# Patient Record
Sex: FEMALE | Race: WHITE | Employment: OTHER | URBAN - METROPOLITAN AREA
[De-identification: names, ages, dates, MRNs, and addresses within clinical notes are randomized per-mention and may not be internally consistent; named-entity substitution may affect disease eponyms.]

---

## 2019-03-22 PROBLEM — G45.9 TIA (TRANSIENT ISCHEMIC ATTACK): Status: ACTIVE | Noted: 2019-03-22

## 2019-03-23 ENCOUNTER — APPOINTMENT (OUTPATIENT)
Dept: ULTRASOUND IMAGING | Age: 84
End: 2019-03-23
Attending: INTERNAL MEDICINE
Payer: MEDICARE

## 2019-03-23 ENCOUNTER — HOSPITAL ENCOUNTER (OUTPATIENT)
Age: 84
Setting detail: OBSERVATION
Discharge: HOME | End: 2019-03-24
Attending: INTERNAL MEDICINE | Admitting: INTERNAL MEDICINE
Payer: MEDICARE

## 2019-03-23 ENCOUNTER — APPOINTMENT (OUTPATIENT)
Dept: MRI IMAGING | Age: 84
End: 2019-03-23
Attending: INTERNAL MEDICINE
Payer: MEDICARE

## 2019-03-23 DIAGNOSIS — G45.9 TIA (TRANSIENT ISCHEMIC ATTACK): Primary | ICD-10-CM

## 2019-03-23 LAB
ANION GAP SERPL CALCULATED.3IONS-SCNC: 11 MMOL/L (ref 7–16)
BUN BLDV-MCNC: 19 MG/DL (ref 8–23)
CALCIUM SERPL-MCNC: 9.2 MG/DL (ref 8.6–10.2)
CHLORIDE BLD-SCNC: 102 MMOL/L (ref 98–107)
CO2: 29 MMOL/L (ref 22–29)
CREAT SERPL-MCNC: 0.9 MG/DL (ref 0.5–1)
GFR AFRICAN AMERICAN: >60
GFR NON-AFRICAN AMERICAN: 60 ML/MIN/1.73
GLUCOSE BLD-MCNC: 110 MG/DL (ref 74–99)
HCT VFR BLD CALC: 38.3 % (ref 34–48)
HEMOGLOBIN: 12.8 G/DL (ref 11.5–15.5)
MCH RBC QN AUTO: 31.4 PG (ref 26–35)
MCHC RBC AUTO-ENTMCNC: 33.4 % (ref 32–34.5)
MCV RBC AUTO: 94.1 FL (ref 80–99.9)
PDW BLD-RTO: 12.2 FL (ref 11.5–15)
PLATELET # BLD: 236 E9/L (ref 130–450)
PMV BLD AUTO: 9.3 FL (ref 7–12)
POTASSIUM SERPL-SCNC: 4 MMOL/L (ref 3.5–5)
RBC # BLD: 4.07 E12/L (ref 3.5–5.5)
SODIUM BLD-SCNC: 142 MMOL/L (ref 132–146)
WBC # BLD: 7 E9/L (ref 4.5–11.5)

## 2019-03-23 PROCEDURE — 85027 COMPLETE CBC AUTOMATED: CPT

## 2019-03-23 PROCEDURE — 2580000003 HC RX 258: Performed by: INTERNAL MEDICINE

## 2019-03-23 PROCEDURE — G0378 HOSPITAL OBSERVATION PER HR: HCPCS

## 2019-03-23 PROCEDURE — 6360000002 HC RX W HCPCS: Performed by: INTERNAL MEDICINE

## 2019-03-23 PROCEDURE — 99221 1ST HOSP IP/OBS SF/LOW 40: CPT | Performed by: PSYCHIATRY & NEUROLOGY

## 2019-03-23 PROCEDURE — 97166 OT EVAL MOD COMPLEX 45 MIN: CPT

## 2019-03-23 PROCEDURE — 96374 THER/PROPH/DIAG INJ IV PUSH: CPT

## 2019-03-23 PROCEDURE — 93880 EXTRACRANIAL BILAT STUDY: CPT

## 2019-03-23 PROCEDURE — 97530 THERAPEUTIC ACTIVITIES: CPT

## 2019-03-23 PROCEDURE — 36415 COLL VENOUS BLD VENIPUNCTURE: CPT

## 2019-03-23 PROCEDURE — 70551 MRI BRAIN STEM W/O DYE: CPT

## 2019-03-23 PROCEDURE — 96372 THER/PROPH/DIAG INJ SC/IM: CPT

## 2019-03-23 PROCEDURE — 80048 BASIC METABOLIC PNL TOTAL CA: CPT

## 2019-03-23 PROCEDURE — 6370000000 HC RX 637 (ALT 250 FOR IP): Performed by: INTERNAL MEDICINE

## 2019-03-23 PROCEDURE — 97162 PT EVAL MOD COMPLEX 30 MIN: CPT

## 2019-03-23 RX ORDER — PAROXETINE HYDROCHLORIDE 20 MG/1
20 TABLET, FILM COATED ORAL DAILY
Status: DISCONTINUED | OUTPATIENT
Start: 2019-03-23 | End: 2019-03-24 | Stop reason: HOSPADM

## 2019-03-23 RX ORDER — METOPROLOL SUCCINATE 50 MG/1
50 TABLET, EXTENDED RELEASE ORAL DAILY
COMMUNITY

## 2019-03-23 RX ORDER — TRIAMTERENE AND HYDROCHLOROTHIAZIDE 37.5; 25 MG/1; MG/1
1 TABLET ORAL DAILY
COMMUNITY

## 2019-03-23 RX ORDER — TRIAMTERENE AND HYDROCHLOROTHIAZIDE 37.5; 25 MG/1; MG/1
1 TABLET ORAL DAILY
Status: DISCONTINUED | OUTPATIENT
Start: 2019-03-23 | End: 2019-03-24 | Stop reason: HOSPADM

## 2019-03-23 RX ORDER — SIMVASTATIN 10 MG
10 TABLET ORAL NIGHTLY
Status: ON HOLD | COMMUNITY
End: 2019-03-24 | Stop reason: HOSPADM

## 2019-03-23 RX ORDER — ATORVASTATIN CALCIUM 40 MG/1
40 TABLET, FILM COATED ORAL NIGHTLY
Status: DISCONTINUED | OUTPATIENT
Start: 2019-03-23 | End: 2019-03-24

## 2019-03-23 RX ORDER — HEPARIN SODIUM 10000 [USP'U]/ML
5000 INJECTION, SOLUTION INTRAVENOUS; SUBCUTANEOUS EVERY 8 HOURS
Status: DISCONTINUED | OUTPATIENT
Start: 2019-03-23 | End: 2019-03-24 | Stop reason: HOSPADM

## 2019-03-23 RX ORDER — PAROXETINE HYDROCHLORIDE 20 MG/1
20 TABLET, FILM COATED ORAL DAILY
COMMUNITY

## 2019-03-23 RX ORDER — ASPIRIN 81 MG/1
81 TABLET ORAL DAILY
Status: DISCONTINUED | OUTPATIENT
Start: 2019-03-23 | End: 2019-03-24 | Stop reason: HOSPADM

## 2019-03-23 RX ORDER — LEVOTHYROXINE SODIUM 0.07 MG/1
75 TABLET ORAL DAILY
COMMUNITY

## 2019-03-23 RX ORDER — LEVOTHYROXINE SODIUM 0.07 MG/1
75 TABLET ORAL DAILY
Status: DISCONTINUED | OUTPATIENT
Start: 2019-03-23 | End: 2019-03-24 | Stop reason: HOSPADM

## 2019-03-23 RX ORDER — SODIUM CHLORIDE 0.9 % (FLUSH) 0.9 %
10 SYRINGE (ML) INJECTION EVERY 12 HOURS SCHEDULED
Status: DISCONTINUED | OUTPATIENT
Start: 2019-03-23 | End: 2019-03-24 | Stop reason: HOSPADM

## 2019-03-23 RX ORDER — ONDANSETRON 2 MG/ML
4 INJECTION INTRAMUSCULAR; INTRAVENOUS EVERY 6 HOURS PRN
Status: DISCONTINUED | OUTPATIENT
Start: 2019-03-23 | End: 2019-03-24 | Stop reason: HOSPADM

## 2019-03-23 RX ORDER — SODIUM CHLORIDE 0.9 % (FLUSH) 0.9 %
10 SYRINGE (ML) INJECTION PRN
Status: DISCONTINUED | OUTPATIENT
Start: 2019-03-23 | End: 2019-03-24 | Stop reason: HOSPADM

## 2019-03-23 RX ORDER — METOPROLOL SUCCINATE 50 MG/1
50 TABLET, EXTENDED RELEASE ORAL DAILY
Status: DISCONTINUED | OUTPATIENT
Start: 2019-03-23 | End: 2019-03-24 | Stop reason: HOSPADM

## 2019-03-23 RX ORDER — DILTIAZEM HYDROCHLORIDE 120 MG/1
TABLET, FILM COATED ORAL 3 TIMES DAILY
COMMUNITY

## 2019-03-23 RX ADMIN — TRIAMTERENE AND HYDROCHLOROTHIAZIDE 1 TABLET: 37.5; 25 TABLET ORAL at 12:13

## 2019-03-23 RX ADMIN — DILTIAZEM HYDROCHLORIDE 120 MG: 30 TABLET, FILM COATED ORAL at 12:13

## 2019-03-23 RX ADMIN — ASPIRIN 81 MG: 81 TABLET ORAL at 12:12

## 2019-03-23 RX ADMIN — Medication 10 ML: at 20:01

## 2019-03-23 RX ADMIN — ATORVASTATIN CALCIUM 40 MG: 40 TABLET, FILM COATED ORAL at 20:01

## 2019-03-23 RX ADMIN — ONDANSETRON HYDROCHLORIDE 4 MG: 2 SOLUTION INTRAMUSCULAR; INTRAVENOUS at 17:50

## 2019-03-23 RX ADMIN — Medication 10 ML: at 12:14

## 2019-03-23 RX ADMIN — LEVOTHYROXINE SODIUM 75 MCG: 75 TABLET ORAL at 12:13

## 2019-03-23 RX ADMIN — HEPARIN SODIUM 5000 UNITS: 10000 INJECTION INTRAVENOUS; SUBCUTANEOUS at 02:54

## 2019-03-23 RX ADMIN — PAROXETINE HYDROCHLORIDE 20 MG: 20 TABLET, FILM COATED ORAL at 12:12

## 2019-03-23 RX ADMIN — HEPARIN SODIUM 5000 UNITS: 10000 INJECTION INTRAVENOUS; SUBCUTANEOUS at 17:58

## 2019-03-24 VITALS
SYSTOLIC BLOOD PRESSURE: 137 MMHG | BODY MASS INDEX: 24.94 KG/M2 | TEMPERATURE: 97.6 F | HEIGHT: 67 IN | WEIGHT: 158.9 LBS | RESPIRATION RATE: 16 BRPM | DIASTOLIC BLOOD PRESSURE: 67 MMHG | HEART RATE: 64 BPM | OXYGEN SATURATION: 96 %

## 2019-03-24 LAB
CHOLESTEROL, TOTAL: 171 MG/DL (ref 0–199)
HBA1C MFR BLD: 5.4 % (ref 4–5.6)
HDLC SERPL-MCNC: 57 MG/DL
LDL CHOLESTEROL CALCULATED: 91 MG/DL (ref 0–99)
TRIGL SERPL-MCNC: 115 MG/DL (ref 0–149)
VLDLC SERPL CALC-MCNC: 23 MG/DL

## 2019-03-24 PROCEDURE — 80061 LIPID PANEL: CPT

## 2019-03-24 PROCEDURE — 83036 HEMOGLOBIN GLYCOSYLATED A1C: CPT

## 2019-03-24 PROCEDURE — 36415 COLL VENOUS BLD VENIPUNCTURE: CPT

## 2019-03-24 PROCEDURE — 2580000003 HC RX 258: Performed by: INTERNAL MEDICINE

## 2019-03-24 PROCEDURE — G0378 HOSPITAL OBSERVATION PER HR: HCPCS

## 2019-03-24 PROCEDURE — 96372 THER/PROPH/DIAG INJ SC/IM: CPT

## 2019-03-24 PROCEDURE — 6370000000 HC RX 637 (ALT 250 FOR IP): Performed by: INTERNAL MEDICINE

## 2019-03-24 PROCEDURE — 6360000002 HC RX W HCPCS: Performed by: INTERNAL MEDICINE

## 2019-03-24 RX ORDER — ATORVASTATIN CALCIUM 10 MG/1
20 TABLET, FILM COATED ORAL NIGHTLY
Status: DISCONTINUED | OUTPATIENT
Start: 2019-03-24 | End: 2019-03-24 | Stop reason: HOSPADM

## 2019-03-24 RX ORDER — CLOPIDOGREL BISULFATE 75 MG/1
75 TABLET ORAL DAILY
Status: DISCONTINUED | OUTPATIENT
Start: 2019-03-24 | End: 2019-03-24 | Stop reason: HOSPADM

## 2019-03-24 RX ORDER — ATORVASTATIN CALCIUM 40 MG/1
20 TABLET, FILM COATED ORAL DAILY
Qty: 30 TABLET | Refills: 3 | Status: SHIPPED | OUTPATIENT
Start: 2019-03-24

## 2019-03-24 RX ORDER — CLOPIDOGREL BISULFATE 75 MG/1
75 TABLET ORAL DAILY
Qty: 30 TABLET | Refills: 3 | Status: SHIPPED | OUTPATIENT
Start: 2019-03-24

## 2019-03-24 RX ADMIN — METOPROLOL SUCCINATE 50 MG: 50 TABLET, EXTENDED RELEASE ORAL at 08:59

## 2019-03-24 RX ADMIN — HEPARIN SODIUM 5000 UNITS: 10000 INJECTION INTRAVENOUS; SUBCUTANEOUS at 02:18

## 2019-03-24 RX ADMIN — Medication 10 ML: at 08:59

## 2019-03-24 RX ADMIN — PAROXETINE HYDROCHLORIDE 20 MG: 20 TABLET, FILM COATED ORAL at 08:59

## 2019-03-24 RX ADMIN — DILTIAZEM HYDROCHLORIDE 120 MG: 30 TABLET, FILM COATED ORAL at 08:59

## 2019-03-24 RX ADMIN — HEPARIN SODIUM 5000 UNITS: 10000 INJECTION INTRAVENOUS; SUBCUTANEOUS at 11:58

## 2019-03-24 RX ADMIN — TRIAMTERENE AND HYDROCHLOROTHIAZIDE 1 TABLET: 37.5; 25 TABLET ORAL at 08:59

## 2019-03-24 RX ADMIN — LEVOTHYROXINE SODIUM 75 MCG: 75 TABLET ORAL at 06:44

## 2019-03-24 RX ADMIN — ASPIRIN 81 MG: 81 TABLET ORAL at 08:59

## 2019-04-18 ENCOUNTER — TELEPHONE (OUTPATIENT)
Dept: ADMINISTRATIVE | Age: 84
End: 2019-04-18

## 2019-04-18 NOTE — TELEPHONE ENCOUNTER
MILLIE June contacted patient and scheduled appointment with stroke clinic. Patient scheduled for 4/26/19 @ 1:00pm. Address and instructions given to patient and patient accepted date and time for appointment.     Electronically signed by Corby Villavicencio on 4/18/19 at 3:56 PM

## 2019-04-26 ENCOUNTER — OFFICE VISIT (OUTPATIENT)
Dept: NEUROLOGY | Age: 84
End: 2019-04-26
Payer: MEDICARE

## 2019-04-26 VITALS
OXYGEN SATURATION: 95 % | HEART RATE: 66 BPM | SYSTOLIC BLOOD PRESSURE: 139 MMHG | BODY MASS INDEX: 24.01 KG/M2 | WEIGHT: 153 LBS | DIASTOLIC BLOOD PRESSURE: 77 MMHG | RESPIRATION RATE: 16 BRPM | HEIGHT: 67 IN

## 2019-04-26 DIAGNOSIS — R13.10 DYSPHAGIA, UNSPECIFIED TYPE: ICD-10-CM

## 2019-04-26 DIAGNOSIS — Z86.73 REMOTE HISTORY OF STROKE: ICD-10-CM

## 2019-04-26 DIAGNOSIS — R29.818 TRANSIENT NEUROLOGIC DEFICIT: ICD-10-CM

## 2019-04-26 DIAGNOSIS — I69.351 HEMIPARESIS AFFECTING RIGHT SIDE AS LATE EFFECT OF STROKE (HCC): ICD-10-CM

## 2019-04-26 DIAGNOSIS — M21.371 RIGHT FOOT DROP: Primary | ICD-10-CM

## 2019-04-26 PROCEDURE — G8427 DOCREV CUR MEDS BY ELIG CLIN: HCPCS | Performed by: NURSE PRACTITIONER

## 2019-04-26 PROCEDURE — 4040F PNEUMOC VAC/ADMIN/RCVD: CPT | Performed by: NURSE PRACTITIONER

## 2019-04-26 PROCEDURE — 1123F ACP DISCUSS/DSCN MKR DOCD: CPT | Performed by: NURSE PRACTITIONER

## 2019-04-26 PROCEDURE — 1036F TOBACCO NON-USER: CPT | Performed by: NURSE PRACTITIONER

## 2019-04-26 PROCEDURE — 99215 OFFICE O/P EST HI 40 MIN: CPT | Performed by: NURSE PRACTITIONER

## 2019-04-26 PROCEDURE — G8400 PT W/DXA NO RESULTS DOC: HCPCS | Performed by: NURSE PRACTITIONER

## 2019-04-26 PROCEDURE — G8420 CALC BMI NORM PARAMETERS: HCPCS | Performed by: NURSE PRACTITIONER

## 2019-04-26 PROCEDURE — 1090F PRES/ABSN URINE INCON ASSESS: CPT | Performed by: NURSE PRACTITIONER

## 2019-04-26 SDOH — HEALTH STABILITY: MENTAL HEALTH: HOW OFTEN DO YOU HAVE A DRINK CONTAINING ALCOHOL?: NEVER

## 2019-04-26 NOTE — PROGRESS NOTES
Ambulatory Clinic Stroke Screen  Physical Therapy, Occupational Therapy & Speech Therapy     Date: 4/26/2019  Name: Saadia Osorio  YOB: 1935  MRN: <[id number]>    1. Are you receiving therapy services? No  If yes, where? NA    2. What are your goals for therapy? NA  Do you feel you are working towards your goals while in therapy? NA    3. Have you had any falls since discharge from hospital? No    4. Do you have any questions about therapy? No    Education provided to pt: Patient educated on benefits of PT, OT and SLP to address her deficits. Also, educated patient on need for an orthotics consult for AFO secondary to R foot drop to improve gait and balance. Pt educated on fall prevention.         PT Screen  ROM BLE: WFL except R ankle DF no AROM, R LE tone/spasticity present   MMT BLE: L LE 5/5, R Hip/Knee 4+/5, R ankle DF 0/5    Transfers  Sit to stand: SBA  Stand to sit: SBA  Stand Pivot: SBA    Gait: 40 feet with no AD SBA, Steppage gait pattern to compensate for R foot drop    PT Recommendations: Home PT, Orthotics consult for R AFO    Luana Hamilton, PT, DPT  [id number]      OT Screen  ROM BUE: RUE- 3/4 AROM with tone noted @ end range from residual deficits from a CVA 12 years ago; LUE WFL  MMT BUE: BUE strength WFL     Tranfers:  Commode transfer: Min A  DME: Recommend 3:1  Tub transfer: Min A   DME: Recommend tub seat  Functional mobility: SBA & did have LOB but did stabilize  Device: would recommend device to ensure pt safety    Visual/Perceptual: WFL  Cognition: decreased insight & judgement    ADL's:  Feeding: Sup- Pt state she occasionally coughs on solid foods & liquids  Grooming: independent  Bathing: CGA- per pt stands in shower with no DME  Dressing: SBA  Home management: assist  DME: no DME     OT Recommendations: Recommend home OT & a speech consult to assess pt swallow & cognition    Dwayne Barahona OTR [id number]

## 2019-04-26 NOTE — PROGRESS NOTES
STROKE CLINIC VISIT      Marylene Din is a 80 y.o. right handed female     History of Present Illness:     Summary of Hospitalization:  She presented on 3/23/19 with sudden onset of facial numbness with rapid resolution. She was transferred from [facility] with reported negative CT head. MRI brain revealed mildly increased signal intensity within the central mar[place] on DWI. Further work-up showed no significant atherosclerosis with LDL 91. She was discharged home on DAPT and statin therapy with MRI brain to be repeated in 2 months. No therapy needs were identified at that time. Stroke Risk Factors:  Hypertension  Hyperlipidemia  History of stroke    Stroke Clinic:  She presents today with her sister and both are fair historians. 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 She reports she received extensive rehabilitation following stroke however continues to have difficulty walking due to right foot weakness. Her sister states the insurance company would not approve any brace because the patient did not have a spinal injury. She reports occasional difficulty with swallowing. She currently resides in [place] and was visiting family in [facility] when she had witnessed episode of left facial droop with associated frontal headache. The sister reports the patient became pale and nauseated as well for which they took her to the emergency. She reports compliance with medications however is missing one medication bottle for cardizem, which she was taking prior to hospitalization. She remains on DAPT and statin therapy without ill effects. She plans to return to [place] following evaluation and repeat MRI. Current Functional Status(Modified Olmstead Scale):   2=Slight disability; unable to carry out all previous activities, but able to look after own affairs without assistance     Review of Systems:     No chest pain, palpitations or shortness of breath  No vertigo, lightheadedness or loss of consciousness  No falls, tripping or stumbling  No incontinence of bowels or bladder  No numbness, tingling  No swallowing or speech difficulties    ROS otherwise negative    Objective:     /77 (Site: Right Lower Arm, Position: Sitting, Cuff Size: Medium Adult)   Pulse 66   Resp 16   Ht 5' 7\" (1.702 m)   Wt 153 lb (69.4 kg)   SpO2 95%   BMI 23.96 kg/m²     General Appearance: alert, cooperative, no distress, appears stated age    Head: normocephalic, without obvious abnormality, atraumatic    Eyes: conjunctiva/corneas clear    Neck: supple, symmetrical, trachea midline,  no carotid bruit    Lungs: clear to auscultation bilaterally, respirations unlabored    Heart: regular rate and rhythm, S1 and S2 normal   Extremities: normal, atraumatic, no cyanosis or edema   Pulses: 2+ and symmetric all extremities   Skin: color, texture and turgor normal, no rashes or lesions     Mental Status: alert and oriented, thought content appropriate, questionable memories, decreased insight  Speech: no dysarthria  Language: no aphasia    Cranial Nerves:  I: smell NA   II: visual acuity  NA   II: visual fields Full    II: pupils Equal and reactive    III,VII: ptosis None   III,IV,VI: extraocular muscles  Full ROM without nystagmus   V: mastication Normal   V: facial light touch sensation  Normal   V,VII: corneal reflex     VII: facial muscle function - upper  Normal   VII: facial muscle function - lower Normal   VIII: hearing Normal   IX: soft palate elevation  Normal   IX,X: gag reflex    XI: trapezius strength  5/5   XI: sternocleidomastoid strength 5/5   XI: neck extension strength  5/5   XII: tongue strength  Normal     Motor:  5/5 throughout except RUE and RLE 4+/5 with 0/5 right ankle dorsiflexion  No abnormal movements  Right hand pronation without downward drift   Normal bulk   Increased tone to RUE and RLE    Sensory:  LT normal Coordination:   FN normal  HS normal    Gait:  Steppage gait    DTR:   Right Brachioradialis reflex 2+  Left Brachioradialis reflex 2+  Right Biceps reflex 3+  Left Biceps reflex 2+  Right Quadriceps reflex 3+  Left Quadriceps reflex 2+    Laboratory/Radiology:     CBC:   Lab Results   Component Value Date    WBC 7.0 03/23/2019    RBC 4.07 03/23/2019    HGB 12.8 03/23/2019    HCT 38.3 03/23/2019    MCV 94.1 03/23/2019    MCH 31.4 03/23/2019    MCHC 33.4 03/23/2019    RDW 12.2 03/23/2019     03/23/2019    MPV 9.3 03/23/2019     CMP:    Lab Results   Component Value Date     03/23/2019    K 4.0 03/23/2019     03/23/2019    CO2 29 03/23/2019    BUN 19 03/23/2019    CREATININE 0.9 03/23/2019    GFRAA >60 03/23/2019    LABGLOM 60 03/23/2019    GLUCOSE 110 03/23/2019    CALCIUM 9.2 03/23/2019     HgBA1c:    Lab Results   Component Value Date    LABA1C 5.4 03/24/2019     FLP:    Lab Results   Component Value Date    TRIG 115 03/24/2019    HDL 57 03/24/2019    LDLCALC 91 03/24/2019    LABVLDL 23 03/24/2019     MRI brain:  Mildly increased signal intensity in central marino, moderate remote microvascular disease, moderate diffuse cerebral atrophy    US carotids:  0-49% of bilateral internal carotid arteries    * Images and labs personally reviewed at the time of this office visit    Assessment/Plan:     Mrs Alia Puentes presents following reported transient episode of facial numbness with occipital headache. MRI brain shows questionable infarction in central marino however signal intensity less than expected on DWI for acute ischemic event. MRI of the brain to be repeated in 1 month to reevaluate. I will call her with results and encouraged her to establish with neurologist upon return home. She has known history of stroke with residual deficits including right sided weakness, RUE and RLE spasticity, gait disturbances and occasional swallowing difficulties.  She reports she intends to return to [place] following MRI and in the meantime would benefit from PT/OT/SLP as well as orthotic consultation for AFO. Her risk factors for stroke including previous stroke, hypertension and elevated LDL. She remains on DAPT for 90 days and to continue ASA thereafter. She also was started on high-intensity statin with goal LDL < 79.     Rey MERCADO, APRN, FNP-C  1:58 PM  4/26/19    I spent 40 minutes with this patient obtaining the HPI, discussing the exam as well as discussing our plan of action. All questions were answered prior to leaving my office.

## 2019-05-02 ENCOUNTER — TELEPHONE (OUTPATIENT)
Dept: NEUROLOGY | Age: 84
End: 2019-05-02

## 2019-05-02 NOTE — TELEPHONE ENCOUNTER
MILLIE Rodriguez contacted patient's sister Leidy Groves regarding referral to home PT, OT,speech therapy, MRI and referral to orthotics. Leidy Groves states that patient has not been to her house in over a week and that she is out visiting people and staying different places. MILLIE Singleton asked if Leidy Groves could please have patient call MA at [phone].     Electronically signed by Tahira Perez on 5/2/19 at 10:52 AM

## 2019-05-09 ENCOUNTER — TELEPHONE (OUTPATIENT)
Dept: NEUROLOGY | Age: 84
End: 2019-05-09

## 2019-05-16 ENCOUNTER — TELEPHONE (OUTPATIENT)
Dept: NEUROLOGY | Age: 84
End: 2019-05-16

## 2019-05-16 DIAGNOSIS — G45.9 TIA (TRANSIENT ISCHEMIC ATTACK): Primary | ICD-10-CM

## 2019-05-16 DIAGNOSIS — Z86.73 HISTORY OF STROKE: ICD-10-CM

## 2019-05-17 ENCOUNTER — TELEPHONE (OUTPATIENT)
Dept: NEUROLOGY | Age: 84
End: 2019-05-17

## 2019-05-17 DIAGNOSIS — M21.371 RIGHT FOOT DROP: Primary | ICD-10-CM

## 2019-05-17 DIAGNOSIS — Z86.73 REMOTE HISTORY OF STROKE: ICD-10-CM

## 2019-05-17 NOTE — TELEPHONE ENCOUNTER
[facility] regarding Dr. Rin Munoz and rehab. MA LM for pt to return call and afterwards noted pt previously saw LINA Gomez. Message being forwarded to [place], [place], for informational purposes.   Electronically signed by Marissa Sampson on 5/17/19 at 8:59 AM

## 2019-06-04 ENCOUNTER — HOSPITAL ENCOUNTER (OUTPATIENT)
Age: 84
Discharge: HOME | End: 2019-06-04
Payer: MEDICARE

## 2019-06-04 ENCOUNTER — HOSPITAL ENCOUNTER (OUTPATIENT)
Dept: MRI IMAGING | Age: 84
Discharge: HOME | End: 2019-06-06
Payer: MEDICARE

## 2019-06-04 DIAGNOSIS — Z86.73 REMOTE HISTORY OF STROKE: ICD-10-CM

## 2019-06-04 DIAGNOSIS — Z86.73 HISTORY OF STROKE: ICD-10-CM

## 2019-06-04 DIAGNOSIS — R29.818 TRANSIENT NEUROLOGIC DEFICIT: ICD-10-CM

## 2019-06-04 DIAGNOSIS — G45.9 TIA (TRANSIENT ISCHEMIC ATTACK): ICD-10-CM

## 2019-06-04 DIAGNOSIS — Z86.73 HISTORY OF STROKE: Primary | ICD-10-CM

## 2019-06-04 LAB
BUN BLDV-MCNC: 21 MG/DL (ref 8–23)
CREAT SERPL-MCNC: 0.9 MG/DL (ref 0.5–1)
GFR AFRICAN AMERICAN: >60
GFR NON-AFRICAN AMERICAN: 60 ML/MIN/1.73

## 2019-06-04 PROCEDURE — 36415 COLL VENOUS BLD VENIPUNCTURE: CPT

## 2019-06-04 PROCEDURE — 84520 ASSAY OF UREA NITROGEN: CPT

## 2019-06-04 PROCEDURE — 70553 MRI BRAIN STEM W/O & W/DYE: CPT

## 2019-06-04 PROCEDURE — A9579 GAD-BASE MR CONTRAST NOS,1ML: HCPCS | Performed by: RADIOLOGY

## 2019-06-04 PROCEDURE — 6360000004 HC RX CONTRAST MEDICATION: Performed by: RADIOLOGY

## 2019-06-04 PROCEDURE — 82565 ASSAY OF CREATININE: CPT

## 2019-06-04 RX ADMIN — GADOTERIDOL 14 ML: 279.3 INJECTION, SOLUTION INTRAVENOUS at 13:36

## 2019-06-13 ENCOUNTER — TELEPHONE (OUTPATIENT)
Dept: PHYSICAL MEDICINE AND REHAB | Age: 84
End: 2019-06-13

## 2019-06-13 ENCOUNTER — OFFICE VISIT (OUTPATIENT)
Dept: PHYSICAL MEDICINE AND REHAB | Age: 84
End: 2019-06-13
Payer: MEDICARE

## 2019-06-13 VITALS
WEIGHT: 154 LBS | HEART RATE: 79 BPM | HEIGHT: 67 IN | DIASTOLIC BLOOD PRESSURE: 72 MMHG | SYSTOLIC BLOOD PRESSURE: 118 MMHG | BODY MASS INDEX: 24.17 KG/M2

## 2019-06-13 DIAGNOSIS — I69.351 HEMIPARESIS OF RIGHT DOMINANT SIDE AS LATE EFFECT OF CEREBRAL INFARCTION (HCC): Primary | ICD-10-CM

## 2019-06-13 DIAGNOSIS — Z74.09 IMPAIRED MOBILITY AND ADLS: ICD-10-CM

## 2019-06-13 DIAGNOSIS — G25.81 RESTLESS LEG SYNDROME: ICD-10-CM

## 2019-06-13 DIAGNOSIS — M21.371 FOOT DROP, RIGHT: ICD-10-CM

## 2019-06-13 DIAGNOSIS — Z78.9 IMPAIRED MOBILITY AND ADLS: ICD-10-CM

## 2019-06-13 PROCEDURE — 1036F TOBACCO NON-USER: CPT | Performed by: PHYSICAL MEDICINE & REHABILITATION

## 2019-06-13 PROCEDURE — 4040F PNEUMOC VAC/ADMIN/RCVD: CPT | Performed by: PHYSICAL MEDICINE & REHABILITATION

## 2019-06-13 PROCEDURE — 1123F ACP DISCUSS/DSCN MKR DOCD: CPT | Performed by: PHYSICAL MEDICINE & REHABILITATION

## 2019-06-13 PROCEDURE — G8427 DOCREV CUR MEDS BY ELIG CLIN: HCPCS | Performed by: PHYSICAL MEDICINE & REHABILITATION

## 2019-06-13 PROCEDURE — 99204 OFFICE O/P NEW MOD 45 MIN: CPT | Performed by: PHYSICAL MEDICINE & REHABILITATION

## 2019-06-13 PROCEDURE — 1090F PRES/ABSN URINE INCON ASSESS: CPT | Performed by: PHYSICAL MEDICINE & REHABILITATION

## 2019-06-13 PROCEDURE — G8420 CALC BMI NORM PARAMETERS: HCPCS | Performed by: PHYSICAL MEDICINE & REHABILITATION

## 2019-06-13 PROCEDURE — G8400 PT W/DXA NO RESULTS DOC: HCPCS | Performed by: PHYSICAL MEDICINE & REHABILITATION

## 2019-06-13 RX ORDER — ROPINIROLE 0.25 MG/1
0.25 TABLET, FILM COATED ORAL NIGHTLY
Qty: 30 TABLET | Refills: 2 | Status: SHIPPED | OUTPATIENT
Start: 2019-06-13

## 2019-06-13 NOTE — TELEPHONE ENCOUNTER
Called and left message on Dr. Jeferson Ragsdale nurse line to find out where they had sent her for home therapy. Will wait for return call from the office.

## 2019-06-13 NOTE — PROGRESS NOTES
Daily      metoprolol succinate (TOPROL XL) 50 MG extended release tablet Take 50 mg by mouth daily      PARoxetine (PAXIL) 20 MG tablet Take 20 mg by mouth daily       triamterene-hydrochlorothiazide (MAXZIDE-25) 37.5-25 MG per tablet Take 1 tablet by mouth daily      diltiazem (CARDIZEM) 120 MG tablet Take by mouth 3 times daily        No current facility-administered medications for this visit. Review of Systems - For review of systems, positive symptoms are underlined and negative findings are not underlined. General: chills, fatigue, fever, malaise, night sweats, weight gain,  weight loss. Psychological: anxiety, depression, suicidal ideation, sleep disturbances, behavioral disorder, difficulty concentrating, disorientation, hallucinations, mood swings, obsessive thoughts, physical abuse,  sexual abuse. Ophthalmic: blurry vision, decreased vision, double vision, loss of vision, photophobia, use of corrective device. Ear Nose Throat: hearing loss, tinnitus, phonophobia, sensitivity to smells, vertigo, or vocal changes. Allergy/Immunology: seasonal allergies, watery eyes, itchy eyes, frequent infections. Hematological and Lymphatic: bleeding problems, blood clots, bruising,  yellowing of the skin, swollen lymph nodes. Endocrine:  polydypsia, polyuria, temperature intolerance. Respiratory: cough, shortness of breath, wheezing. Cardiovascular: syncope, chest pain, dyspnea on exertion, edema, irregular heartbeat,  palpitations. Gastrointestinal: abdominal pain, constipation, diarrhea,  decreased appetite, heartburn, hematemesis, melena, nausea, vomiting, stool incontinence, abnormal swallowing. Genito-Urinary: dysuria, hematuria, incontinence, frequency, urgency. Musculoskeletal: joint pain, stiffness, swelling, muscle pain, muscle  tenderness.  Neurological: confusion, memory loss, dizziness, gait disturbance, headaches, impaired coordination, decreased balance, numbness/tingling, seizures, speech problems, tremors,weakness. Dermatological:  hair changes, nail changes, pruritus, rash. Physical Exam: Blood pressure 118/72, pulse 79, height 5' 7\" (1.702 m), weight 154 lb (69.9 kg). General: The patient is in no apparent distress. Body habitus is non-obese. HEENT: No rhinorrhea, sneezing, yawning, or lacrimation. No scleral icterus or conjunctival injection. SKIN: No piloerection. No track marks. No rash. Normal turgor. No erythema or ecchymosis. Psychological: Mood and affect are appropriate. Hygiene is appropriate. Cardiovascular:  Heart is regular rate and rhythm. Peripheral pulses are 2+ at the dorsalis pedis, posterior tibial and radial arteries. There is no edema. Respiratory: Respirations are regular and unlabored. There is no cyanosis. Lymphatic: There is no cervical or inguinal lymphadenopathy. Gastrointestinal: Soft abdomen, non-tender. No pulsating abdominal mass. Genitourinary: No costovertebral angle tenderness. MSK: There is no joint effusion, deformity, instability, swelling, erythema or warmth. AROM is full in the spine and extremities. Spinal curvatures are normal.     Neurologic: Awake, alert and oriented in three planes. Speech is fluent. No facial weakness. Tongue is midline. Hearing is intact for conversation. Pupils are equal and round. Extraocular muscles are intact. Hearing is intact for conversation. Shoulder shrug symmetric. Sensation: Intact for light touch and pin prick in all upper and lower extremity dermatomes. Vibration and proprioception are intact at the bilateral first MTP. Strength: 4/5 intrinsic hand right, knee extension right, 0/5 right ankle all planes, otherwise, 5/5 in all myotomes in the upper and lower extremities. Muscle Tendon Reflexes: 2+ symmetric in the bilateral upper and lower extremities. Babinski is downgoing bilaterally. Mason Curtis is negative bilaterally. Gait is steppage on the right. Romberg is negative.   Heel and toe walk are normal.  Tandem walk is normal.  No clonus or spasticity. The patient was able to rise from a chair and squat without difficulty. There is no tremor. Impression:   1. 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 Foot drop, right    3. Restless leg syndrome    4. Impaired mobility and ADLs        Plan:   Orders Placed This Encounter   Procedures    External Referral To Home Health     Referral Priority:   Routine     Referral Type:   Home Health Care     Referral Reason:   Specialty Services Required     Requested Specialty:   [address]     Number of Visits Requested:   1    External Referral To Orthotics     Referral Priority:   Routine     Referral Type:   Eval and Treat     Referral Reason:   Specialty Services Required     Requested Specialty:   Orthotics     Number of Visits Requested:   1     Orders Placed This Encounter   Medications    rOPINIRole (REQUIP) 0.25 MG tablet     Sig: Take 1 tablet by mouth nightly     Dispense:  30 tablet     Refill:  2     The patient was educated about the diagnosis, prognosis, indications, risks and benefits of treatment. An opportunity to ask questions was given to the patient and questions were answered. The patient agreed to proceed with the recommended treatment as described above. Follow up once AFO obtained     Thank you for the consultation and for allowing me to participate in the care of this patient. Sincerely,         Annmarie Coombs D.O., P.T.   Board Certified Physical Medicine and Rehabilitation  Board Certified Electrodiagnostic Medicine

## 2019-06-14 NOTE — TELEPHONE ENCOUNTER
Naun Bill from Dr. Cristy [facility] stroke clinic called back and no home care was initiated due to patient was house jumping and was not at one place for a long period of time. Will send order to [facility].

## 2019-06-18 ENCOUNTER — TELEPHONE (OUTPATIENT)
Dept: NEUROLOGY | Age: 84
End: 2019-06-18

## 2019-06-19 ENCOUNTER — TELEPHONE (OUTPATIENT)
Dept: INPATIENT UNIT | Age: 84
End: 2019-06-19

## 2019-06-25 ENCOUNTER — TELEPHONE (OUTPATIENT)
Dept: INPATIENT UNIT | Age: 84
End: 2019-06-25

## 2019-06-25 ENCOUNTER — TELEPHONE (OUTPATIENT)
Dept: PHYSICAL MEDICINE AND REHAB | Age: 84
End: 2019-06-25

## 2019-06-25 NOTE — TELEPHONE ENCOUNTER
MRI results reviewed with patient and discussed continuing medical management. She states she is no longer returning to her home in [place] and is staying with family in the area. She reports that [facility] has recently contacted her regarding home PT/OT as she had difficulty arranging services with previous home care companies. Per the medical record, home care was not initiated as she was \"house jumping. \"    Will plan for follow up in 3-4 months.

## 2019-06-27 ENCOUNTER — TELEPHONE (OUTPATIENT)
Dept: PHYSICAL MEDICINE AND REHAB | Age: 84
End: 2019-06-27

## 2019-06-27 NOTE — TELEPHONE ENCOUNTER
[facility] called to inform us that they saw the patient today for the initial eval and will be seeing this patient twice a week for 3 weeks.